# Patient Record
Sex: FEMALE | Race: WHITE | Employment: FULL TIME | URBAN - METROPOLITAN AREA
[De-identification: names, ages, dates, MRNs, and addresses within clinical notes are randomized per-mention and may not be internally consistent; named-entity substitution may affect disease eponyms.]

---

## 2019-10-04 ENCOUNTER — APPOINTMENT (OUTPATIENT)
Dept: NUCLEAR MEDICINE | Age: 54
End: 2019-10-04
Attending: OTOLARYNGOLOGY
Payer: COMMERCIAL

## 2019-10-04 ENCOUNTER — ANESTHESIA EVENT (OUTPATIENT)
Dept: MEDSURG UNIT | Age: 54
End: 2019-10-04
Payer: COMMERCIAL

## 2019-10-04 ENCOUNTER — HOSPITAL ENCOUNTER (OUTPATIENT)
Age: 54
Setting detail: OBSERVATION
Discharge: HOME | End: 2019-10-04
Attending: OTOLARYNGOLOGY | Admitting: OTOLARYNGOLOGY
Payer: COMMERCIAL

## 2019-10-04 ENCOUNTER — ANESTHESIA (OUTPATIENT)
Dept: MEDSURG UNIT | Age: 54
End: 2019-10-04
Payer: COMMERCIAL

## 2019-10-04 VITALS
WEIGHT: 196.4 LBS | HEART RATE: 81 BPM | DIASTOLIC BLOOD PRESSURE: 88 MMHG | OXYGEN SATURATION: 95 % | TEMPERATURE: 97.7 F | BODY MASS INDEX: 29.77 KG/M2 | HEIGHT: 68 IN | RESPIRATION RATE: 16 BRPM | SYSTOLIC BLOOD PRESSURE: 145 MMHG

## 2019-10-04 DIAGNOSIS — D49.7 PARATHYROID NEOPLASM: ICD-10-CM

## 2019-10-04 PROBLEM — E21.3 HYPERPARATHYROIDISM (HCC): Status: ACTIVE | Noted: 2019-10-04

## 2019-10-04 LAB
INTRA-OP PTH, IPTHRT: 136.1 PG/ML (ref 18.4–88)
INTRA-OP PTH, IPTHRT: 17.7 PG/ML (ref 18.4–88)
PTH-INTACT IO % DIF SERPL: ABNORMAL %
SPECIMEN DRAWN SERPL: 1230
SPECIMEN DRAWN SERPL: 1448

## 2019-10-04 PROCEDURE — 77030040361 HC SLV COMPR DVT MDII -B: Performed by: OTOLARYNGOLOGY

## 2019-10-04 PROCEDURE — 85018 HEMOGLOBIN: CPT

## 2019-10-04 PROCEDURE — 76060000062 HC AMB SURG ANES 1 TO 1.5 HR: Performed by: OTOLARYNGOLOGY

## 2019-10-04 PROCEDURE — 77030002996 HC SUT SLK J&J -A: Performed by: OTOLARYNGOLOGY

## 2019-10-04 PROCEDURE — 99218 HC RM OBSERVATION: CPT

## 2019-10-04 PROCEDURE — 77030021052 HC RNG RETRCTR STAY COOP -A: Performed by: OTOLARYNGOLOGY

## 2019-10-04 PROCEDURE — 74011250636 HC RX REV CODE- 250/636: Performed by: NURSE ANESTHETIST, CERTIFIED REGISTERED

## 2019-10-04 PROCEDURE — A9500 TC99M SESTAMIBI: HCPCS

## 2019-10-04 PROCEDURE — 74011000250 HC RX REV CODE- 250: Performed by: NURSE ANESTHETIST, CERTIFIED REGISTERED

## 2019-10-04 PROCEDURE — 77030002933 HC SUT MCRYL J&J -A: Performed by: OTOLARYNGOLOGY

## 2019-10-04 PROCEDURE — 77030026438 HC STYL ET INTUB CARD -A: Performed by: NURSE ANESTHETIST, CERTIFIED REGISTERED

## 2019-10-04 PROCEDURE — 74011250637 HC RX REV CODE- 250/637: Performed by: ANESTHESIOLOGY

## 2019-10-04 PROCEDURE — 77030019655 HC PRB STIM CRAN MEDT -B: Performed by: OTOLARYNGOLOGY

## 2019-10-04 PROCEDURE — 77030008462 HC STPLR SKN PROX J&J -A: Performed by: OTOLARYNGOLOGY

## 2019-10-04 PROCEDURE — 77030036668 HC HEMSTAT APPL W/HEMADERM KT -BARD -F: Performed by: OTOLARYNGOLOGY

## 2019-10-04 PROCEDURE — 77030014008 HC SPNG HEMSTAT J&J -C: Performed by: OTOLARYNGOLOGY

## 2019-10-04 PROCEDURE — 77030011640 HC PAD GRND REM COVD -A: Performed by: OTOLARYNGOLOGY

## 2019-10-04 PROCEDURE — 77030011247 HC DRN WND KT TLS STRY -B: Performed by: OTOLARYNGOLOGY

## 2019-10-04 PROCEDURE — 74011000250 HC RX REV CODE- 250: Performed by: OTOLARYNGOLOGY

## 2019-10-04 PROCEDURE — 76030000019 HC AMB SURG 1 TO 1.5 HR INTENSV-TIER 1: Performed by: OTOLARYNGOLOGY

## 2019-10-04 PROCEDURE — 74011250636 HC RX REV CODE- 250/636: Performed by: OTOLARYNGOLOGY

## 2019-10-04 PROCEDURE — 77030018836 HC SOL IRR NACL ICUM -A: Performed by: OTOLARYNGOLOGY

## 2019-10-04 PROCEDURE — 83970 ASSAY OF PARATHORMONE: CPT

## 2019-10-04 PROCEDURE — 77030040356 HC CORD BPLR FRCP COVD -A: Performed by: OTOLARYNGOLOGY

## 2019-10-04 PROCEDURE — 88331 PATH CONSLTJ SURG 1 BLK 1SPC: CPT

## 2019-10-04 PROCEDURE — 74011250636 HC RX REV CODE- 250/636: Performed by: ANESTHESIOLOGY

## 2019-10-04 PROCEDURE — 77030032988 HC TU ET NIM TRIVNTG EMG MEDT -D: Performed by: OTOLARYNGOLOGY

## 2019-10-04 PROCEDURE — 77030031139 HC SUT VCRL2 J&J -A: Performed by: OTOLARYNGOLOGY

## 2019-10-04 PROCEDURE — 77030040922 HC BLNKT HYPOTHRM STRY -A

## 2019-10-04 PROCEDURE — 77030031753 HC SHR ENDO COAG HARM J&J -E: Performed by: OTOLARYNGOLOGY

## 2019-10-04 PROCEDURE — 88305 TISSUE EXAM BY PATHOLOGIST: CPT

## 2019-10-04 PROCEDURE — 77030002916 HC SUT ETHLN J&J -A: Performed by: OTOLARYNGOLOGY

## 2019-10-04 PROCEDURE — 76210000035 HC AMBSU PH I REC 1 TO 1.5 HR: Performed by: OTOLARYNGOLOGY

## 2019-10-04 PROCEDURE — 36415 COLL VENOUS BLD VENIPUNCTURE: CPT

## 2019-10-04 PROCEDURE — 77030011267 HC ELECTRD BLD COVD -A: Performed by: OTOLARYNGOLOGY

## 2019-10-04 RX ORDER — MIDAZOLAM HYDROCHLORIDE 1 MG/ML
0.5 INJECTION, SOLUTION INTRAMUSCULAR; INTRAVENOUS
Status: DISCONTINUED | OUTPATIENT
Start: 2019-10-04 | End: 2019-10-04 | Stop reason: HOSPADM

## 2019-10-04 RX ORDER — HYDROMORPHONE HYDROCHLORIDE 1 MG/ML
0.2 INJECTION, SOLUTION INTRAMUSCULAR; INTRAVENOUS; SUBCUTANEOUS
Status: DISCONTINUED | OUTPATIENT
Start: 2019-10-04 | End: 2019-10-04 | Stop reason: HOSPADM

## 2019-10-04 RX ORDER — ONDANSETRON 2 MG/ML
4 INJECTION INTRAMUSCULAR; INTRAVENOUS
Status: DISCONTINUED | OUTPATIENT
Start: 2019-10-04 | End: 2019-10-04 | Stop reason: HOSPADM

## 2019-10-04 RX ORDER — OXYCODONE AND ACETAMINOPHEN 5; 325 MG/1; MG/1
1 TABLET ORAL
Status: DISCONTINUED | OUTPATIENT
Start: 2019-10-04 | End: 2019-10-04 | Stop reason: HOSPADM

## 2019-10-04 RX ORDER — CEFAZOLIN SODIUM/WATER 2 G/20 ML
2 SYRINGE (ML) INTRAVENOUS ONCE
Status: COMPLETED | OUTPATIENT
Start: 2019-10-04 | End: 2019-10-04

## 2019-10-04 RX ORDER — DIPHENHYDRAMINE HYDROCHLORIDE 50 MG/ML
12.5 INJECTION, SOLUTION INTRAMUSCULAR; INTRAVENOUS AS NEEDED
Status: DISCONTINUED | OUTPATIENT
Start: 2019-10-04 | End: 2019-10-04 | Stop reason: HOSPADM

## 2019-10-04 RX ORDER — MIDAZOLAM HYDROCHLORIDE 1 MG/ML
1 INJECTION, SOLUTION INTRAMUSCULAR; INTRAVENOUS AS NEEDED
Status: DISCONTINUED | OUTPATIENT
Start: 2019-10-04 | End: 2019-10-04 | Stop reason: HOSPADM

## 2019-10-04 RX ORDER — DEXAMETHASONE SODIUM PHOSPHATE 4 MG/ML
INJECTION, SOLUTION INTRA-ARTICULAR; INTRALESIONAL; INTRAMUSCULAR; INTRAVENOUS; SOFT TISSUE AS NEEDED
Status: DISCONTINUED | OUTPATIENT
Start: 2019-10-04 | End: 2019-10-04 | Stop reason: HOSPADM

## 2019-10-04 RX ORDER — LIDOCAINE HYDROCHLORIDE 10 MG/ML
0.1 INJECTION, SOLUTION EPIDURAL; INFILTRATION; INTRACAUDAL; PERINEURAL AS NEEDED
Status: DISCONTINUED | OUTPATIENT
Start: 2019-10-04 | End: 2019-10-04 | Stop reason: HOSPADM

## 2019-10-04 RX ORDER — ONDANSETRON 8 MG/1
4-8 TABLET, ORALLY DISINTEGRATING ORAL
Qty: 15 TAB | Refills: 1 | Status: SHIPPED | OUTPATIENT
Start: 2019-10-04

## 2019-10-04 RX ORDER — MIDAZOLAM HYDROCHLORIDE 1 MG/ML
INJECTION, SOLUTION INTRAMUSCULAR; INTRAVENOUS AS NEEDED
Status: DISCONTINUED | OUTPATIENT
Start: 2019-10-04 | End: 2019-10-04 | Stop reason: HOSPADM

## 2019-10-04 RX ORDER — SODIUM CHLORIDE, SODIUM LACTATE, POTASSIUM CHLORIDE, CALCIUM CHLORIDE 600; 310; 30; 20 MG/100ML; MG/100ML; MG/100ML; MG/100ML
100 INJECTION, SOLUTION INTRAVENOUS CONTINUOUS
Status: DISCONTINUED | OUTPATIENT
Start: 2019-10-04 | End: 2019-10-04 | Stop reason: HOSPADM

## 2019-10-04 RX ORDER — ONDANSETRON 2 MG/ML
4 INJECTION INTRAMUSCULAR; INTRAVENOUS AS NEEDED
Status: DISCONTINUED | OUTPATIENT
Start: 2019-10-04 | End: 2019-10-04 | Stop reason: HOSPADM

## 2019-10-04 RX ORDER — FENTANYL CITRATE 50 UG/ML
25 INJECTION, SOLUTION INTRAMUSCULAR; INTRAVENOUS
Status: DISCONTINUED | OUTPATIENT
Start: 2019-10-04 | End: 2019-10-04 | Stop reason: HOSPADM

## 2019-10-04 RX ORDER — LIDOCAINE HYDROCHLORIDE AND EPINEPHRINE 10; 10 MG/ML; UG/ML
INJECTION, SOLUTION INFILTRATION; PERINEURAL AS NEEDED
Status: DISCONTINUED | OUTPATIENT
Start: 2019-10-04 | End: 2019-10-04 | Stop reason: HOSPADM

## 2019-10-04 RX ORDER — OXYCODONE AND ACETAMINOPHEN 10; 325 MG/1; MG/1
1 TABLET ORAL
Status: DISCONTINUED | OUTPATIENT
Start: 2019-10-04 | End: 2019-10-04 | Stop reason: HOSPADM

## 2019-10-04 RX ORDER — ONDANSETRON 2 MG/ML
INJECTION INTRAMUSCULAR; INTRAVENOUS AS NEEDED
Status: DISCONTINUED | OUTPATIENT
Start: 2019-10-04 | End: 2019-10-04 | Stop reason: HOSPADM

## 2019-10-04 RX ORDER — EPHEDRINE SULFATE/0.9% NACL/PF 50 MG/5 ML
SYRINGE (ML) INTRAVENOUS AS NEEDED
Status: DISCONTINUED | OUTPATIENT
Start: 2019-10-04 | End: 2019-10-04 | Stop reason: HOSPADM

## 2019-10-04 RX ORDER — SODIUM CHLORIDE 0.9 % (FLUSH) 0.9 %
5-40 SYRINGE (ML) INJECTION AS NEEDED
Status: DISCONTINUED | OUTPATIENT
Start: 2019-10-04 | End: 2019-10-04 | Stop reason: HOSPADM

## 2019-10-04 RX ORDER — ACETAMINOPHEN 325 MG/1
650 TABLET ORAL ONCE
Status: COMPLETED | OUTPATIENT
Start: 2019-10-04 | End: 2019-10-04

## 2019-10-04 RX ORDER — EPHEDRINE SULFATE/0.9% NACL/PF 50 MG/5 ML
5 SYRINGE (ML) INTRAVENOUS AS NEEDED
Status: DISCONTINUED | OUTPATIENT
Start: 2019-10-04 | End: 2019-10-04 | Stop reason: HOSPADM

## 2019-10-04 RX ORDER — CHOLECALCIFEROL (VITAMIN D3) 125 MCG
2000 CAPSULE ORAL
COMMUNITY
End: 2019-10-04

## 2019-10-04 RX ORDER — FENTANYL CITRATE 50 UG/ML
INJECTION, SOLUTION INTRAMUSCULAR; INTRAVENOUS AS NEEDED
Status: DISCONTINUED | OUTPATIENT
Start: 2019-10-04 | End: 2019-10-04 | Stop reason: HOSPADM

## 2019-10-04 RX ORDER — LIDOCAINE HYDROCHLORIDE 20 MG/ML
INJECTION, SOLUTION EPIDURAL; INFILTRATION; INTRACAUDAL; PERINEURAL AS NEEDED
Status: DISCONTINUED | OUTPATIENT
Start: 2019-10-04 | End: 2019-10-04 | Stop reason: HOSPADM

## 2019-10-04 RX ORDER — ROPIVACAINE HYDROCHLORIDE 5 MG/ML
150 INJECTION, SOLUTION EPIDURAL; INFILTRATION; PERINEURAL AS NEEDED
Status: DISCONTINUED | OUTPATIENT
Start: 2019-10-04 | End: 2019-10-04 | Stop reason: HOSPADM

## 2019-10-04 RX ORDER — PHENYLEPHRINE HCL IN 0.9% NACL 0.4MG/10ML
SYRINGE (ML) INTRAVENOUS AS NEEDED
Status: DISCONTINUED | OUTPATIENT
Start: 2019-10-04 | End: 2019-10-04 | Stop reason: HOSPADM

## 2019-10-04 RX ORDER — PROPOFOL 10 MG/ML
INJECTION, EMULSION INTRAVENOUS AS NEEDED
Status: DISCONTINUED | OUTPATIENT
Start: 2019-10-04 | End: 2019-10-04 | Stop reason: HOSPADM

## 2019-10-04 RX ORDER — SODIUM CHLORIDE 9 MG/ML
25 INJECTION, SOLUTION INTRAVENOUS CONTINUOUS
Status: DISCONTINUED | OUTPATIENT
Start: 2019-10-04 | End: 2019-10-04 | Stop reason: HOSPADM

## 2019-10-04 RX ORDER — ROCURONIUM BROMIDE 10 MG/ML
INJECTION, SOLUTION INTRAVENOUS AS NEEDED
Status: DISCONTINUED | OUTPATIENT
Start: 2019-10-04 | End: 2019-10-04 | Stop reason: HOSPADM

## 2019-10-04 RX ORDER — SODIUM CHLORIDE 0.9 % (FLUSH) 0.9 %
5-40 SYRINGE (ML) INJECTION EVERY 8 HOURS
Status: DISCONTINUED | OUTPATIENT
Start: 2019-10-04 | End: 2019-10-04 | Stop reason: HOSPADM

## 2019-10-04 RX ORDER — FERROUS SULFATE, DRIED 160(50) MG
2 TABLET, EXTENDED RELEASE ORAL EVERY 6 HOURS
Status: DISCONTINUED | OUTPATIENT
Start: 2019-10-04 | End: 2019-10-04 | Stop reason: HOSPADM

## 2019-10-04 RX ORDER — FERROUS SULFATE, DRIED 160(50) MG
2 TABLET, EXTENDED RELEASE ORAL EVERY 6 HOURS
Qty: 136 TAB | Refills: 1 | Status: SHIPPED | OUTPATIENT
Start: 2019-10-04 | End: 2019-10-18

## 2019-10-04 RX ORDER — ACETAMINOPHEN 325 MG/1
650 TABLET ORAL
Status: DISCONTINUED | OUTPATIENT
Start: 2019-10-04 | End: 2019-10-04 | Stop reason: HOSPADM

## 2019-10-04 RX ORDER — MORPHINE SULFATE 10 MG/ML
2 INJECTION, SOLUTION INTRAMUSCULAR; INTRAVENOUS
Status: DISCONTINUED | OUTPATIENT
Start: 2019-10-04 | End: 2019-10-04 | Stop reason: HOSPADM

## 2019-10-04 RX ORDER — FENTANYL CITRATE 50 UG/ML
50 INJECTION, SOLUTION INTRAMUSCULAR; INTRAVENOUS AS NEEDED
Status: DISCONTINUED | OUTPATIENT
Start: 2019-10-04 | End: 2019-10-04 | Stop reason: HOSPADM

## 2019-10-04 RX ORDER — OXYCODONE HYDROCHLORIDE AND ACETAMINOPHEN 7.5; 325 MG/1; MG/1
1 TABLET ORAL
Qty: 42 TAB | Refills: 0 | Status: SHIPPED | OUTPATIENT
Start: 2019-10-04 | End: 2019-10-11

## 2019-10-04 RX ORDER — DEXTROSE, SODIUM CHLORIDE, AND POTASSIUM CHLORIDE 5; .45; .15 G/100ML; G/100ML; G/100ML
25 INJECTION INTRAVENOUS CONTINUOUS
Status: DISCONTINUED | OUTPATIENT
Start: 2019-10-04 | End: 2019-10-04 | Stop reason: HOSPADM

## 2019-10-04 RX ORDER — SCOLOPAMINE TRANSDERMAL SYSTEM 1 MG/1
1 PATCH, EXTENDED RELEASE TRANSDERMAL
Status: DISCONTINUED | OUTPATIENT
Start: 2019-10-04 | End: 2019-10-04 | Stop reason: HOSPADM

## 2019-10-04 RX ORDER — DEXMEDETOMIDINE HYDROCHLORIDE 100 UG/ML
INJECTION, SOLUTION INTRAVENOUS AS NEEDED
Status: DISCONTINUED | OUTPATIENT
Start: 2019-10-04 | End: 2019-10-04 | Stop reason: HOSPADM

## 2019-10-04 RX ORDER — SUCCINYLCHOLINE CHLORIDE 20 MG/ML
INJECTION INTRAMUSCULAR; INTRAVENOUS AS NEEDED
Status: DISCONTINUED | OUTPATIENT
Start: 2019-10-04 | End: 2019-10-04 | Stop reason: HOSPADM

## 2019-10-04 RX ORDER — SODIUM CHLORIDE, SODIUM LACTATE, POTASSIUM CHLORIDE, CALCIUM CHLORIDE 600; 310; 30; 20 MG/100ML; MG/100ML; MG/100ML; MG/100ML
1000 INJECTION, SOLUTION INTRAVENOUS CONTINUOUS
Status: DISCONTINUED | OUTPATIENT
Start: 2019-10-04 | End: 2019-10-04 | Stop reason: HOSPADM

## 2019-10-04 RX ORDER — SODIUM CHLORIDE, SODIUM LACTATE, POTASSIUM CHLORIDE, CALCIUM CHLORIDE 600; 310; 30; 20 MG/100ML; MG/100ML; MG/100ML; MG/100ML
INJECTION, SOLUTION INTRAVENOUS
Status: DISCONTINUED | OUTPATIENT
Start: 2019-10-04 | End: 2019-10-04 | Stop reason: HOSPADM

## 2019-10-04 RX ORDER — OXYCODONE HYDROCHLORIDE 5 MG/1
5 TABLET ORAL AS NEEDED
Status: DISCONTINUED | OUTPATIENT
Start: 2019-10-04 | End: 2019-10-04 | Stop reason: HOSPADM

## 2019-10-04 RX ADMIN — PROPOFOL 50 MG: 10 INJECTION, EMULSION INTRAVENOUS at 14:24

## 2019-10-04 RX ADMIN — Medication 2 G: at 14:14

## 2019-10-04 RX ADMIN — FENTANYL CITRATE 50 MCG: 50 INJECTION, SOLUTION INTRAMUSCULAR; INTRAVENOUS at 14:06

## 2019-10-04 RX ADMIN — Medication 80 MCG: at 14:44

## 2019-10-04 RX ADMIN — SUCCINYLCHOLINE CHLORIDE 140 MG: 20 INJECTION, SOLUTION INTRAMUSCULAR; INTRAVENOUS at 14:01

## 2019-10-04 RX ADMIN — PROPOFOL 200 MG: 10 INJECTION, EMULSION INTRAVENOUS at 14:01

## 2019-10-04 RX ADMIN — Medication 10 MG: at 14:18

## 2019-10-04 RX ADMIN — SODIUM CHLORIDE, POTASSIUM CHLORIDE, SODIUM LACTATE AND CALCIUM CHLORIDE: 600; 310; 30; 20 INJECTION, SOLUTION INTRAVENOUS at 13:46

## 2019-10-04 RX ADMIN — SODIUM CHLORIDE, SODIUM LACTATE, POTASSIUM CHLORIDE, AND CALCIUM CHLORIDE 1000 ML: 600; 310; 30; 20 INJECTION, SOLUTION INTRAVENOUS at 12:37

## 2019-10-04 RX ADMIN — DEXMEDETOMIDINE HYDROCHLORIDE 4 MCG: 100 INJECTION, SOLUTION, CONCENTRATE INTRAVENOUS at 15:00

## 2019-10-04 RX ADMIN — LIDOCAINE HYDROCHLORIDE 100 MG: 20 INJECTION, SOLUTION EPIDURAL; INFILTRATION; INTRACAUDAL; PERINEURAL at 14:01

## 2019-10-04 RX ADMIN — DEXMEDETOMIDINE HYDROCHLORIDE 4 MCG: 100 INJECTION, SOLUTION, CONCENTRATE INTRAVENOUS at 14:18

## 2019-10-04 RX ADMIN — MIDAZOLAM 2 MG: 1 INJECTION INTRAMUSCULAR; INTRAVENOUS at 13:56

## 2019-10-04 RX ADMIN — ACETAMINOPHEN 650 MG: 325 TABLET, FILM COATED ORAL at 12:37

## 2019-10-04 RX ADMIN — DEXAMETHASONE SODIUM PHOSPHATE 8 MG: 4 INJECTION, SOLUTION INTRAMUSCULAR; INTRAVENOUS at 14:11

## 2019-10-04 RX ADMIN — FENTANYL CITRATE 25 MCG: 50 INJECTION INTRAMUSCULAR; INTRAVENOUS at 15:26

## 2019-10-04 RX ADMIN — PROPOFOL 50 MG: 10 INJECTION, EMULSION INTRAVENOUS at 14:41

## 2019-10-04 RX ADMIN — PROPOFOL 50 MG: 10 INJECTION, EMULSION INTRAVENOUS at 14:28

## 2019-10-04 RX ADMIN — SODIUM CHLORIDE, SODIUM LACTATE, POTASSIUM CHLORIDE, AND CALCIUM CHLORIDE 1000 ML: 600; 310; 30; 20 INJECTION, SOLUTION INTRAVENOUS at 12:43

## 2019-10-04 RX ADMIN — Medication 10 MG: at 14:14

## 2019-10-04 RX ADMIN — DEXMEDETOMIDINE HYDROCHLORIDE 6 MCG: 100 INJECTION, SOLUTION, CONCENTRATE INTRAVENOUS at 14:30

## 2019-10-04 RX ADMIN — ONDANSETRON HYDROCHLORIDE 4 MG: 2 INJECTION, SOLUTION INTRAVENOUS at 14:11

## 2019-10-04 RX ADMIN — FENTANYL CITRATE 50 MCG: 50 INJECTION, SOLUTION INTRAMUSCULAR; INTRAVENOUS at 14:01

## 2019-10-04 RX ADMIN — ROCURONIUM BROMIDE 5 MG: 10 SOLUTION INTRAVENOUS at 14:01

## 2019-10-04 RX ADMIN — DEXMEDETOMIDINE HYDROCHLORIDE 6 MCG: 100 INJECTION, SOLUTION, CONCENTRATE INTRAVENOUS at 14:11

## 2019-10-04 NOTE — BRIEF OP NOTE
BRIEF OPERATIVE NOTE    DDate of Procedure: 04 October June 2019  Pre-operative Diagnosis: Primary Hyperparathyroidism  Post-operative Diagnosis: Primary Hyperparathyroidism  Procedure(s):   Neck exploration   Radioguided parathyroid surgery  Parathyroidectomy  resection of LEFT SUPERIOR parathyroid adenoma  Laryngeal nerve monitoring  Surgeon(s) and Role:   Panel 1:   * Marla Retana MD  Co-surgeon   * Vannesa Berry MD - Co-surgeon   Anesthesia: General +4 ml of Local (50:50 mix of 1% LIDO + EPI)   Urine output: Not documented   Estimated Blood Loss: 1 ml    IVF: 700 ml   Drains: None   Pre-operative iPTH = 136.1 pg/ml   Patient in room at 1357 hours   Antibiotic prophylaxis ANCEF 2.0 gm given at 1414 hours   Beta blocker not indicated   Pre-prep time out: 1411 hours   Prayer at 1423 hours   Time out for Surgery at 1423 hours   (Correct patient, operative site and procedure confirmed, along with having the necessary equipment on hand to perform the operation safely)   Start of surgery at 1423 hours   End of surgery at 1452 hours   VTE prophylaxis with bilateral thigh high TAMIA hose and bilateral lower extremity compression devices   Pressure points padded   Sponge, sharp and instrument count: Correct   History:  58-year-old female with symptomatic primary hyperparathyroidism. History of multiple kidney stones. She has biochemically confirmed primary hyperparathyroidism:   Serum calcium 11.1 mg/dl  iPTH 138.1 pg/ml    Pre-operative sestamibi scan shows left-sided neck uptake consistent with left sided parathyroid adenoma    Pre-op holding area: The patient was seen in the pre-operative holding area.    Patient was informed of the indications, nature, risks, benefits, alternatives and expected outcomes of the proposed operation:   Neck exploration   Radioguided parathyroid surgery / parathyroidectomy   All other indicated procedures   The patient previously voiced understanding of the aforesaid and provided informed consent for the planned operation   The patient asked pertinent questions, all of which were answered to her apparent satisfaction   The informed consent was reviewed in the holding area and the patient voiced understanding and agreement. The patient agreed to proceed with the recommended operative procedure (Neck exploration; radioguided parathyroid surgery / parathyroidectomy; all other indicated procedures). The patient was examined, and the operative site was marked. Pre-operative Sestamibi Scan done prior to operation is localizing to the left side of the neck in the superior parathyroid gland location  Procedure (s) performed:   Neck exploration   Radioguided parathyroid surgery  Parathyroidectomy  resection of LEFT SUPERIOR parathyroid adenoma  Laryngeal nerve monitoring   Findings:   One abnormal parathyroid gland identified  grossly consistent with adenoma in the LEFT SUPERIOR anatomical location   Clinically normal right superior parathyroid gland  Clinically normal right inferior parathyroid gland  Clinically normal left inferior parathyroid gland  Left superior parathyroid adenoma (44 x 18 mm; 2.60 grams); completely excised  intact and submitted to pathology. Frozen section: confirmatory  hypercellular parathyroid tissue   Left inferior parathyroid gland, clinically normal (6 x 4 mm); No biopsy obtained. Right superior parathyroid gland, clinically normal (5 x 4 mm); No biopsy obtained. Right inferior parathyroid gland, clinically normal (6 x 4 mm); No biopsy obtained. No palpable thyroid nodule  No findings suspicious for malignancy. No palpable central or lateral neck adenopathy.    A diligent search of the central and both lateral areas of the neck was unrevealing, specifically there was no palpable adenopathy in either the right or left lateral (Level II, III, IV) neck, or the central compartment (Level VI and VII)     Both right and left recurrent laryngeal nerves were identified and carefully preserved throughout the entire course of the operation, and both recurrent laryngeal nerves were confirmed to be structurally and functionally intact. The structural and functional integrity of the left and right recurrent laryngeal nerves was confirmed with the nerve stimulator (NIM System), as prominent audible signal was attained when both branches of the left and right recurrent laryngeal nerves were stimulated. Excellent hemostasis confirmed at end of operation       Specimens:  Para-   thyroid  Right    Left      Type  Score   Type  Score    Upper     Upper      Parathyroid Clinically normal   (5 x 4 mm in size)  Biopsy not obtained  Frozen section: None    Parathyroid Adenoma   (44 x 18 mm in size;   weight 2.60 grams)   Completely excised   Frozen: Hypercellular parathyroid   Counts = 394  Background: 34     Lower          Parathyroid Clinically normal   (6 x 4 mm in size)  Biopsy not obtained  Frozen section: None    Parathyroid Clinically normal   (6 x 4 mm in size)  Biopsy not obtained  Frozen section: None          Surgical Staff:  Circ-1: Lyndsay Shelton RN  Circ-2: Christina Bingham RN  Scrub Tech-1: Minerva Paulino  Surg Asst-1: Vee GAUTAM  Event Time In Time Out   Incision Start 1423    Incision Close 1500        Specimens:   ID Type Source Tests Collected by Time Destination   1 : left superior parathyroid adenoma  Frozen Section Parathyroid  Bharati Merida MD 10/4/2019 1432 Pathology      Operation:   The patient was escorted to the operating room   Upon arrival to the operative room, the patient, procedure, and operative site were confirmed via a pre-operative time-out; all staff in attendance were in agreement. The patient was placed in the supine position and general anesthesia induced without incident, using a NIM endotracheal tube for laryngeal nerve monitoring.    Prophylactic antibiotic (ANCEF 2 g IVPB) was administered prior to the procedure. Beta blocker not indicated  With the patient in the supine position the arms were tucked, a pillow was placed behind the knees and the heels padded; the neck was slightly extended, and head of the operating table elevated to 30 degrees. Roll was placed behind the scapulae to create mild degree of neck flexion   Pressure ulcer prophylaxis was in effect with pressure point padding   VTE prophylaxis was also in effect with TAMIA hose and lower extremity mechanical compression devices   Sterile anterior neck prep (Chlorhexidine - alcohol) and drape   We prayed for our patient and we repeated the TIME OUT prior to commencing the operation to confirm the    correct patient,    correct operative site,    correct operative procedure, and    necessary equipment on hand to conduct the operation safely. Local anesthesia (4 ml of 50:50 mix of 1% LIDO + EPI) infiltrated subcutaneously at site of planned anterior cervical skin incision   Pre-operative serum iPTH level was 136.1 pg/ml. A 5 cm, anterior cervical incision made. Limited sub-platysmal flaps were created. [email or web address] Navigator GPS radio-immuno-guided surgery device was set on Tc99m and 100X. Strap musculature was  in the midline. A dissection plane was developed posterior to the right strap musculature using cautery and facilitated by atraumatic [facility] retractors. Jackolyn Baptise was used to displace the right thyroid lobe anteriorly and medially as the right lateral thyroid recess was developed using electrocautery, and then gentle blunt dissection using the Kitner dissector. The fibroareolar floor in the right posterior neck was opened with electrocautery that provided access to the right retroesophageal space.    We sought to identify the key structures for exposure of the right superior parathyroid gland the arteriovenous band situated immediately cephalad to where the right middle thyroid vein is situated, and posterior and lateral to the recurrent laryngeal nerve. The superior parathyroid gland is usually encountered at the Ligament of Perez dorsolateral to the recurrent laryngeal nerve. The superior parathyroid gland originates from the 4th pharyngeal pouch. The superior parathyroid gland is more posterior in position relative to the more anteriorly situated inferior parathyroid gland. The inferior parathyroid gland is usually encountered anterior and medial to the recurrent laryngeal nerve, inferior to where the recurrent laryngeal nerve crosses the inferior thyroid artery. The inferior parathyroid gland originates from the 3rd pharyngeal pouch. Typically (~75% of cases), the superior parathyroid gland is situated near the posterior aspect of the thyroid gland, at the level of the cricothyroid junction, in proximity to where the recurrent laryngeal nerve enters the larynx. The superior parathyroid gland is consistently situated dorsal/posterior and lateral to the recurrent laryngeal nerve. In a smaller percentage of patients (~24% of cases) the superior parathyroid gland is adjacent to the superior pole of the thyroid gland. In a very small percentage of patients (~1%), the superior parathyroid gland is in a retro-pharyngeal, para-esophageal, retro-esophageal, or posterior-superior mediastinal location. Rarely is the superior parathyroid gland situated within the substance of the superior or lateral pole of the thyroid gland. The right superior parathyroid gland was identified and found to be clinically normal in size and appearance (5 x 4 mm in size)  soft, tan, flattened, ovoid and smoothly encapsulated. We took care not to disrupt the right superior parathyroid gland during dissection, mobilization and complete evaluation of this parathyroid gland.    The normal appearing right superior parathyroid gland was not biopsied  ---   We began the search for the right inferior parathyroid gland by dissecting gently low into the thyro-thymic ligament and apical thymus, and then proceeding cephalad with the dissection. The anatomic location of the inferior parathyroid gland is much more variable than that of the superior parathyroid gland, given the longer and more variable embryological migratory path of the 3rd pharyngeal pouch with the thymus. The inferior parathyroid gland may be embedded within the thymus, within the thyroid gland itself, or found within the anterior or posterior mediastinum. The most common location we find the inferior parathyroid gland (~40% of the time) is in proximity to the posterior thyroid capsule at the level of the 1st tracheal ring, in proximity to the inferior thyroid artery - anterior and medial to the recurrent laryngeal nerve, inferior to where the nerve crosses the inferior thyroid artery. When we dont find the inferior parathyroid gland in this location we search for other possible anatomic locations  thyro-thymic ligament, within the substance of the thymus near the level of the thoracic inlet (in ~40% of cases the inferior parathyroid gland is found in this location). Other potential anatomic locations for the inferior parathyroid gland include lateral to the thyroid gland (15%), or in atypical or ectopic locations (carotid sheath or mid-thyroid level lateral to the carotid sheath, or deep within the thyroid gland itself 2% of cases). When not identified during initial operation, post-operative imaging is performed in cases of persistent or recurrent hyperparathyroidism to search for the abnormal inferior parathyroid gland, or in rare cases, the supernumerary or 5th parathyroid gland.    Anatomic areas assessed during post-operative imaging in patients with persistent or recurrent hyperparathyroidism include the anterior or posterior mediastinum, posterior to the trachea and anterior to the esophagus, or posterior to the esophagus, and at or above the angle of the mandible (to assess for an undescended parathyroid gland). The right inferior parathyroid gland was identified and found to be clinically normal in size and appearance (6 x 4 mm in size)  soft, tan, flattened, ovoid and smoothly encapsulated. We took care not to disrupt the right superior parathyroid gland during dissection, mobilization and complete evaluation of this parathyroid gland. The normal appearing right inferior parathyroid gland was not biopsied  ---   Attention was then directed to the left neck. A dissection plane was developed posterior to the left neck strap musculature using cautery and facilitated by atraumatic popexpert-Lecompte retractors   A Milon Hirschfeld was used to displace the left thyroid lobe anteromedially as the left lateral thyroid recess was developed using electrocautery, and then gentle blunt dissection using a Kitner dissector. The fibroareolar floor was opened with electrocautery that provided access to the left posterior neck  the retroesophageal space. We sought to identify the key structures for exposure of the left superior parathyroid gland the arteriovenous band situated immediately cephalad to where the left middle thyroid vein is situated. The left superior parathyroid gland was situated just posterior and lateral to the encountered left recurrent laryngeal nerve   Dissection was performed using a combination of gentle blunt Kitner dissection as well as the tip of a right-angle clamp to identify and expose an abnormal appearing left superior parathyroid gland (44 x 18 mm in size)  grossly consistent with left superior parathyroid gland adenoma. We took care not to disrupt the parathyroid gland during dissection, mobilization and complete evaluation of the left superior parathyroid gland. ---   We began the search for the left inferior parathyroid gland by dissecting gently low into the thyro-thymic ligament and thymus and then proceeding cephalad with the dissection.    Dissection was performed using a combination of gentle blunt Kitner dissection as well as the tip of a right-angle clamp to identify and expose a normal appearing left inferior parathyroid gland. The left inferior parathyroid gland was situated just anterior and medial to the encountered left recurrent laryngeal nerve   The left inferior parathyroid gland was examined in its entirety and found to be clinically normal in size and appearance (6 x 4 mm in size)  soft, tan, flattened, ovoid and smoothly encapsulated. We took care not to disrupt the parathyroid gland capsule during dissection, mobilization and complete evaluation of the left inferior parathyroid gland. The normal appearing left inferior parathyroid gland was not biopsied   ---   Having identified four parathyroid glands, one of which was abnormal in size and appearance (the left superior parathyroid gland), we re-directed our attention to the abnormal parathyroid gland  We took care not to disrupt the parathyroid gland during dissection, mobilization and complete evaluation of the left superior parathyroid gland. The left superior parathyroid gland adenoma was excised (44 x 18 mm in size and 2.60 grams in weight with ex vivo counts of 394 pg/mg/min and background count of 34 pg/mg/min; i.e. hyperactive parathyroid), and submitted to frozen section, which was confirmatory  hypercellular parathyroid tissue   Blood was drawn after the excision of the left superior parathyroid adenoma - serum iPTH 10 minutes following left superior parathyroid adenoma resection was 17.7 pg/ml. ---  Throughout our dissection on both sides of the neck we oriented on the recurrent laryngeal nerves and kept the dissection on the capsule of all four parathyroid glands, taking great care not to rupture the parathyroid gland capsule during complete assessment of each parathyroid gland.    We made certain to expose the entire parathyroid gland for each one identified to assess each gland fully. As we did on the right, during dissection on the left we took care to maintain the encountered recurrent laryngeal nerve out of harms way. With three normal appearing parathyroid glands identified (right and left inferior and right superior parathyroid glands), and one abnormal parathyroid gland excised (left superior parathyroid adenoma  hypercellular parathyroid tissue on frozen section), and with normalization of serum iPTH after removal of the left superior parathyroid adenoma, we elected to conclude the operation. To summarize: 10 minutes post-resection of the left superior parathyroid adenoma, serum iPTH level dropped from 136.1 pg/ml to 17.7 pg/ml, biochemically consistent with curative resection according to our pre-specified criteria:   1. > 50% drop in serum iPTH from pre-operative baseline, and   2. drop of serum iPTH into the normal range. The structural and functional integrity of the left and right recurrent laryngeal nerves was confirmed with the nerve stimulator (Simple.TV System), as a loud audible signal was attained when both branches of the left and right recurrent laryngeal nerves were stimulated. A diligent search of the central and both lateral areas of the neck was unrevealing, specifically there was no palpable adenopathy in either the right or left lateral (Level II, III, IV) neck, or the central compartment (Level VI and VII). Excellent hemostasis was confirmed. The operative site was irrigated with normal saline and excellent hemostasis confirmed. Excellent hemostasis in the operative field was re-confirmed under repeated Valsalva maneuver. The operative field was irrigated with saline and hemostasis once again confirmed  Yrn was placed in the operative field as a hemostatic adjunct   The strap musculature (sternothyroid and sternohyoid muscles) was re-approximated in the midline with running 3-0 Vicryl suture.    The platysma muscle was reconstituted with running absorbable (3-0 Vicryl) suture. The wound was irrigated with saline. The skin closure was completed with running subcuticular 5-0 monocryl suture and transversely oriented Steri-strips applied to the closed surgical incision   This was an uncomplicated operation with minimal blood loss. The patient was extubated without incident and escorted to the PACU in stable condition with aspiration precautions in effect   Complications: None   Implants: None   Disposition:    To PACU extubated and in stable condition with aspiration precautions in effect   Roman Blanc MD

## 2019-10-04 NOTE — ANESTHESIA PREPROCEDURE EVALUATION
Relevant Problems   No relevant active problems       Anesthetic History     PONV          Review of Systems / Medical History  Patient summary reviewed, nursing notes reviewed and pertinent labs reviewed    Pulmonary          Smoker         Neuro/Psych   Within defined limits           Cardiovascular    Hypertension                   GI/Hepatic/Renal  Within defined limits              Endo/Other        Obesity     Other Findings            Physical Exam    Airway  Mallampati: II  TM Distance: > 6 cm  Neck ROM: normal range of motion   Mouth opening: Normal     Cardiovascular  Regular rate and rhythm,  S1 and S2 normal,  no murmur, click, rub, or gallop             Dental  No notable dental hx       Pulmonary  Breath sounds clear to auscultation               Abdominal  GI exam deferred       Other Findings            Anesthetic Plan    ASA: 2  Anesthesia type: general          Induction: Intravenous  Anesthetic plan and risks discussed with: Patient

## 2019-10-04 NOTE — ANESTHESIA POSTPROCEDURE EVALUATION
Post-Anesthesia Evaluation and Assessment    Patient: George Zhang MRN: [id number]  SSN: [id number]    YOB: 1965  Age: 53 y.o. Sex: female      I have evaluated the patient and they are stable and ready for discharge from the PACU. Cardiovascular Function/Vital Signs  Visit Vitals  /71   Pulse 72   Temp 36.4 °C (97.6 °F)   Resp 17   Ht 5' 7.5\" (1.715 m)   Wt 89.1 kg (196 lb 6.4 oz)   SpO2 96%   BMI 30.31 kg/m²       Patient is status post General anesthesia for Procedure(s): MININMALLY INVASIVE RADIOGUIDED PARATHRYROID EXPLORATION WITH IOPTH. Nausea/Vomiting: None    Postoperative hydration reviewed and adequate. Pain:  Pain Scale 1: Numeric (0 - 10) (10/04/19 1221)  Pain Intensity 1: 0 (10/04/19 1221)   Managed    Neurological Status:   Neuro (WDL): Within Defined Limits (10/04/19 1252)   At baseline    Mental Status, Level of Consciousness: Alert and  oriented to person, place, and time    Pulmonary Status:   O2 Device: Room air (10/04/19 1508)   Adequate oxygenation and airway patent    Complications related to anesthesia: None    Post-anesthesia assessment completed. No concerns    Signed By: Quiana Dillon MD     October 4, 2019              Procedure(s): MININMALLY INVASIVE RADIOGUIDED PARATHRYROID EXPLORATION WITH IOPTH. general    <BSHSIANPOST>    Vitals Value Taken Time   /71 10/4/2019  3:15 PM   Temp 36.4 °C (97.6 °F) 10/4/2019  3:08 PM   Pulse 77 10/4/2019  3:22 PM   Resp 17 10/4/2019  3:22 PM   SpO2 96 % 10/4/2019  3:22 PM   Vitals shown include unvalidated device data.

## 2019-10-04 NOTE — PERIOP NOTES
TRANSFER - OUT REPORT:    Verbal report given to Marly LEIGH(name) [facility]  being transferred to [facility](unit) for routine post - op       Report consisted of patients Situation, Background, Assessment and   Recommendations(SBAR). Time Pre op antibiotic given:1414  Anesthesia Stop time: 7931  Schuler Present on Transfer to floor:no  Order for Schuler on Chart:na  Discharge Prescriptions with Chart:na    Information from the following report(s) SBAR, Kardex, Intake/Output and MAR was reviewed with the receiving nurse. Opportunity for questions and clarification was provided. Is the patient on 02? NO       L/Min na       Other na    Is the patient on a monitor? NO    Is the nurse transporting with the patient? YES    Surgical Waiting Area notified of patient's transfer from PACU?  YES      The following personal items collected during your admission accompanied patient upon transfer:   Dental Appliance: Dental Appliances: (permanent plate)  Vision:    Hearing Aid:    Jewelry:    Clothing: Clothing: (clothes in locker)  Other Valuables:    Valuables sent to safe:

## 2019-10-04 NOTE — PROGRESS NOTES
TRANSFER - IN REPORT:    Verbal report received from Rep RN(name) on Rosa Peck  being received from [facility](unit) for routine post - op      Report consisted of patients Situation, Background, Assessment and   Recommendations(SBAR). Information from the following report(s) SBAR, Kardex, OR Summary, Procedure Summary, Intake/Output and MAR was reviewed with the receiving nurse. Opportunity for questions and clarification was provided. Assessment completed upon patients arrival to unit and care assumed. 1620- patient and patients  arrived on the unit, both appeared angry and were in shock that the patient was to stay the night in the hospital after surgery. Patient is stable and no complaints of pain or nausea. Is voiding. 36- MD called to clarify admission orders. 65- MD gave orders to DC patient after they come and round on them. 1720- explained to the patient that there was a misunderstanding since the patient stated she would need to stay the night because she wouldn't have a ride home after surgery. The patient stated she remembered having that conversation with the MD and said everything was fine. 1840- MD at bedside, orders to DC    1900- Patients  still appears angry and is cussing at the nursing staff, making rude comments. 1910-I have reviewed discharge instructions with the patient and spouse. The patient and spouse verbalized understanding. Discharge medications reviewed with patient and spouse and appropriate educational materials and side effects teaching were provided. 1920- Patients  still appears angry, is very loudly and rudely making comments towards the PCT.

## 2019-10-04 NOTE — PERIOP NOTES
Dr. Zeny Ryan talked with and examined patient. Pt is a smoker, no inhalers. Dr. Zeny Ryan auscultated and states pt clear preoperatively.

## 2019-10-04 NOTE — OP NOTES
Date of Procedure: 04 October June 2019  Pre-operative Diagnosis: Primary Hyperparathyroidism  Post-operative Diagnosis: Primary Hyperparathyroidism  Procedure(s):   Neck exploration   Radioguided parathyroid surgery  Parathyroidectomy  resection of LEFT SUPERIOR parathyroid adenoma  Laryngeal nerve monitoring  Surgeon(s) and Role:   Panel 1:   * Shantal Kramer MD  Co-surgeon   * Mary Seals MD - Co-surgeon   Anesthesia: General +4 ml of Local (50:50 mix of 1% LIDO + EPI)   Urine output: Not documented   Estimated Blood Loss: 1 ml    IVF: 700 ml   Drains: None   Pre-operative iPTH = 136.1 pg/ml   Patient in room at 1357 hours   Antibiotic prophylaxis ANCEF 2.0 gm given at 1414 hours   Beta blocker not indicated   Pre-prep time out: 1411 hours   Prayer at 1423 hours   Time out for Surgery at 1423 hours   (Correct patient, operative site and procedure confirmed, along with having the necessary equipment on hand to perform the operation safely)   Start of surgery at 1423 hours   End of surgery at 1452 hours   VTE prophylaxis with bilateral thigh high TAMIA hose and bilateral lower extremity compression devices   Pressure points padded   Sponge, sharp and instrument count: Correct   History:  53-year-old female with symptomatic primary hyperparathyroidism. History of multiple kidney stones. She has biochemically confirmed primary hyperparathyroidism:   Serum calcium 11.1 mg/dl  iPTH 138.1 pg/ml    Pre-operative sestamibi scan shows left-sided neck uptake consistent with left sided parathyroid adenoma    Pre-op holding area: The patient was seen in the pre-operative holding area.    Patient was informed of the indications, nature, risks, benefits, alternatives and expected outcomes of the proposed operation:   Neck exploration   Radioguided parathyroid surgery / parathyroidectomy   All other indicated procedures   The patient previously voiced understanding of the aforesaid and provided informed consent for the planned operation   The patient asked pertinent questions, all of which were answered to her apparent satisfaction   The informed consent was reviewed in the holding area and the patient voiced understanding and agreement. The patient agreed to proceed with the recommended operative procedure (Neck exploration; radioguided parathyroid surgery / parathyroidectomy; all other indicated procedures). The patient was examined, and the operative site was marked. Pre-operative Sestamibi Scan done prior to operation is localizing to the left side of the neck in the superior parathyroid gland location  Procedure (s) performed:   Neck exploration   Radioguided parathyroid surgery  Parathyroidectomy  resection of LEFT SUPERIOR parathyroid adenoma  Laryngeal nerve monitoring   Findings:   One abnormal parathyroid gland identified  grossly consistent with adenoma in the LEFT SUPERIOR anatomical location   Clinically normal right superior parathyroid gland  Clinically normal right inferior parathyroid gland  Clinically normal left inferior parathyroid gland  Left superior parathyroid adenoma (44 x 18 mm; 2.60 grams); completely excised  intact and submitted to pathology. Frozen section: confirmatory  hypercellular parathyroid tissue   Left inferior parathyroid gland, clinically normal (6 x 4 mm); No biopsy obtained. Right superior parathyroid gland, clinically normal (5 x 4 mm); No biopsy obtained. Right inferior parathyroid gland, clinically normal (6 x 4 mm); No biopsy obtained. No palpable thyroid nodule  No findings suspicious for malignancy. No palpable central or lateral neck adenopathy.    A diligent search of the central and both lateral areas of the neck was unrevealing, specifically there was no palpable adenopathy in either the right or left lateral (Level II, III, IV) neck, or the central compartment (Level VI and VII)     Both right and left recurrent laryngeal nerves were identified and carefully preserved throughout the entire course of the operation, and both recurrent laryngeal nerves were confirmed to be structurally and functionally intact. The structural and functional integrity of the left and right recurrent laryngeal nerves was confirmed with the nerve stimulator (NIM System), as prominent audible signal was attained when both branches of the left and right recurrent laryngeal nerves were stimulated. Excellent hemostasis confirmed at end of operation       Specimens:  Para-   thyroid  Right    Left      Type  Score   Type  Score    Upper     Upper      Parathyroid Clinically normal   (5 x 4 mm in size)  Biopsy not obtained  Frozen section: None    Parathyroid Adenoma   (44 x 18 mm in size;   weight 2.60 grams)   Completely excised   Frozen: Hypercellular parathyroid   Counts = 394  Background: 34     Lower          Parathyroid Clinically normal   (6 x 4 mm in size)  Biopsy not obtained  Frozen section: None    Parathyroid Clinically normal   (6 x 4 mm in size)  Biopsy not obtained  Frozen section: None        Operation:   The patient was escorted to the operating room   Upon arrival to the operative room, the patient, procedure, and operative site were confirmed via a pre-operative time-out; all staff in attendance were in agreement. The patient was placed in the supine position and general anesthesia induced without incident, using a NIM endotracheal tube for laryngeal nerve monitoring. Prophylactic antibiotic (ANCEF 2 g IVPB) was administered prior to the procedure. Beta blocker not indicated  With the patient in the supine position the arms were tucked, a pillow was placed behind the knees and the heels padded; the neck was slightly extended, and head of the operating table elevated to 30 degrees.    Roll was placed behind the scapulae to create mild degree of neck flexion   Pressure ulcer prophylaxis was in effect with pressure point padding   VTE prophylaxis was also in effect with TAMIA hose and lower extremity mechanical compression devices   Sterile anterior neck prep (Chlorhexidine - alcohol) and drape   We prayed for our patient and we repeated the TIME OUT prior to commencing the operation to confirm the    correct patient,    correct operative site,    correct operative procedure, and    necessary equipment on hand to conduct the operation safely. Local anesthesia (4 ml of 50:50 mix of 1% LIDO + EPI) infiltrated subcutaneously at site of planned anterior cervical skin incision   Pre-operative serum iPTH level was 136.1 pg/ml. A 5 cm, anterior cervical incision made. Limited sub-platysmal flaps were created. [employer] Navigator GPS radio-immuno-guided surgery device was set on Tc99m and 100X. Strap musculature was  in the midline. A dissection plane was developed posterior to the right strap musculature using cautery and facilitated by atraumatic [facility] retractors. Lendon Jayme was used to displace the right thyroid lobe anteriorly and medially as the right lateral thyroid recess was developed using electrocautery, and then gentle blunt dissection using the Kitner dissector. The fibroareolar floor in the right posterior neck was opened with electrocautery that provided access to the right retroesophageal space. We sought to identify the key structures for exposure of the right superior parathyroid gland the arteriovenous band situated immediately cephalad to where the right middle thyroid vein is situated, and posterior and lateral to the recurrent laryngeal nerve. The superior parathyroid gland is usually encountered at the Ligament of Perez dorsolateral to the recurrent laryngeal nerve. The superior parathyroid gland originates from the 4th pharyngeal pouch. The superior parathyroid gland is more posterior in position relative to the more anteriorly situated inferior parathyroid gland.    The inferior parathyroid gland is usually encountered anterior and medial to the recurrent laryngeal nerve, inferior to where the recurrent laryngeal nerve crosses the inferior thyroid artery. The inferior parathyroid gland originates from the 3rd pharyngeal pouch. Typically (~75% of cases), the superior parathyroid gland is situated near the posterior aspect of the thyroid gland, at the level of the cricothyroid junction, in proximity to where the recurrent laryngeal nerve enters the larynx. The superior parathyroid gland is consistently situated dorsal/posterior and lateral to the recurrent laryngeal nerve. In a smaller percentage of patients (~24% of cases) the superior parathyroid gland is adjacent to the superior pole of the thyroid gland. In a very small percentage of patients (~1%), the superior parathyroid gland is in a retro-pharyngeal, para-esophageal, retro-esophageal, or posterior-superior mediastinal location. Rarely is the superior parathyroid gland situated within the substance of the superior or lateral pole of the thyroid gland. The right superior parathyroid gland was identified and found to be clinically normal in size and appearance (5 x 4 mm in size)  soft, tan, flattened, ovoid and smoothly encapsulated. We took care not to disrupt the right superior parathyroid gland during dissection, mobilization and complete evaluation of this parathyroid gland. The normal appearing right superior parathyroid gland was not biopsied  ---   We began the search for the right inferior parathyroid gland by dissecting gently low into the thyro-thymic ligament and apical thymus, and then proceeding cephalad with the dissection. The anatomic location of the inferior parathyroid gland is much more variable than that of the superior parathyroid gland, given the longer and more variable embryological migratory path of the 3rd pharyngeal pouch with the thymus.   The inferior parathyroid gland may be embedded within the thymus, within the thyroid gland itself, or found within the anterior or posterior mediastinum. The most common location we find the inferior parathyroid gland (~40% of the time) is in proximity to the posterior thyroid capsule at the level of the 1st tracheal ring, in proximity to the inferior thyroid artery - anterior and medial to the recurrent laryngeal nerve, inferior to where the nerve crosses the inferior thyroid artery. When we dont find the inferior parathyroid gland in this location we search for other possible anatomic locations  thyro-thymic ligament, within the substance of the thymus near the level of the thoracic inlet (in ~40% of cases the inferior parathyroid gland is found in this location). Other potential anatomic locations for the inferior parathyroid gland include lateral to the thyroid gland (15%), or in atypical or ectopic locations (carotid sheath or mid-thyroid level lateral to the carotid sheath, or deep within the thyroid gland itself 2% of cases). When not identified during initial operation, post-operative imaging is performed in cases of persistent or recurrent hyperparathyroidism to search for the abnormal inferior parathyroid gland, or in rare cases, the supernumerary or 5th parathyroid gland. Anatomic areas assessed during post-operative imaging in patients with persistent or recurrent hyperparathyroidism include the anterior or posterior mediastinum, posterior to the trachea and anterior to the esophagus, or posterior to the esophagus, and at or above the angle of the mandible (to assess for an undescended parathyroid gland). The right inferior parathyroid gland was identified and found to be clinically normal in size and appearance (6 x 4 mm in size)  soft, tan, flattened, ovoid and smoothly encapsulated. We took care not to disrupt the right superior parathyroid gland during dissection, mobilization and complete evaluation of this parathyroid gland.    The normal appearing right inferior parathyroid gland was not biopsied  ---   Attention was then directed to the left neck. A dissection plane was developed posterior to the left neck strap musculature using cautery and facilitated by atraumatic [employer] retractors   A Milon Hirschfeld was used to displace the left thyroid lobe anteromedially as the left lateral thyroid recess was developed using electrocautery, and then gentle blunt dissection using a Kitner dissector. The fibroareolar floor was opened with electrocautery that provided access to the left posterior neck  the retroesophageal space. We sought to identify the key structures for exposure of the left superior parathyroid gland the arteriovenous band situated immediately cephalad to where the left middle thyroid vein is situated. The left superior parathyroid gland was situated just posterior and lateral to the encountered left recurrent laryngeal nerve   Dissection was performed using a combination of gentle blunt Kitner dissection as well as the tip of a right-angle clamp to identify and expose an abnormal appearing left superior parathyroid gland (44 x 18 mm in size)  grossly consistent with left superior parathyroid gland adenoma. We took care not to disrupt the parathyroid gland during dissection, mobilization and complete evaluation of the left superior parathyroid gland. ---   We began the search for the left inferior parathyroid gland by dissecting gently low into the thyro-thymic ligament and thymus and then proceeding cephalad with the dissection. Dissection was performed using a combination of gentle blunt Kitner dissection as well as the tip of a right-angle clamp to identify and expose a normal appearing left inferior parathyroid gland.   The left inferior parathyroid gland was situated just anterior and medial to the encountered left recurrent laryngeal nerve   The left inferior parathyroid gland was examined in its entirety and found to be clinically normal in size and appearance (6 x 4 mm in size)  soft, tan, flattened, ovoid and smoothly encapsulated. We took care not to disrupt the parathyroid gland capsule during dissection, mobilization and complete evaluation of the left inferior parathyroid gland. The normal appearing left inferior parathyroid gland was not biopsied   ---   Having identified four parathyroid glands, one of which was abnormal in size and appearance (the left superior parathyroid gland), we re-directed our attention to the abnormal parathyroid gland  We took care not to disrupt the parathyroid gland during dissection, mobilization and complete evaluation of the left superior parathyroid gland. The left superior parathyroid gland adenoma was excised (44 x 18 mm in size and 2.60 grams in weight with ex vivo counts of 394 pg/mg/min and background count of 34 pg/mg/min; i.e. hyperactive parathyroid), and submitted to frozen section, which was confirmatory  hypercellular parathyroid tissue   Blood was drawn after the excision of the left superior parathyroid adenoma - serum iPTH 10 minutes following left superior parathyroid adenoma resection was 17.7 pg/ml. ---  Throughout our dissection on both sides of the neck we oriented on the recurrent laryngeal nerves and kept the dissection on the capsule of all four parathyroid glands, taking great care not to rupture the parathyroid gland capsule during complete assessment of each parathyroid gland. We made certain to expose the entire parathyroid gland for each one identified to assess each gland fully. As we did on the right, during dissection on the left we took care to maintain the encountered recurrent laryngeal nerve out of harms way.    With three normal appearing parathyroid glands identified (right and left inferior and right superior parathyroid glands), and one abnormal parathyroid gland excised (left superior parathyroid adenoma  hypercellular parathyroid tissue on frozen section), and with normalization of serum iPTH after removal of the left superior parathyroid adenoma, we elected to conclude the operation. To summarize: 10 minutes post-resection of the left superior parathyroid adenoma, serum iPTH level dropped from 136.1 pg/ml to 17.7 pg/ml, biochemically consistent with curative resection according to our pre-specified criteria:   1. > 50% drop in serum iPTH from pre-operative baseline, and   2. drop of serum iPTH into the normal range. The structural and functional integrity of the left and right recurrent laryngeal nerves was confirmed with the nerve stimulator (FLIP4NEW System), as a loud audible signal was attained when both branches of the left and right recurrent laryngeal nerves were stimulated. A diligent search of the central and both lateral areas of the neck was unrevealing, specifically there was no palpable adenopathy in either the right or left lateral (Level II, III, IV) neck, or the central compartment (Level VI and VII). Excellent hemostasis was confirmed. The operative site was irrigated with normal saline and excellent hemostasis confirmed. Excellent hemostasis in the operative field was re-confirmed under repeated Valsalva maneuver. The operative field was irrigated with saline and hemostasis once again confirmed  Yrn was placed in the operative field as a hemostatic adjunct   The strap musculature (sternothyroid and sternohyoid muscles) was re-approximated in the midline with running 3-0 Vicryl suture. The platysma muscle was reconstituted with running absorbable (3-0 Vicryl) suture. The wound was irrigated with saline. The skin closure was completed with running subcuticular 5-0 monocryl suture and transversely oriented Steri-strips applied to the closed surgical incision   This was an uncomplicated operation with minimal blood loss.    The patient was extubated without incident and escorted to the PACU in stable condition with aspiration precautions in effect Complications: None   Implants: None   Disposition:    To PACU extubated and in stable condition with aspiration precautions in effect   Mandie EVANSA FACS  Mago Allred MD

## 2019-10-04 NOTE — DISCHARGE INSTRUCTIONS
Post  Parathyroidectomy Instructions    Follow up: with Dr. Nick Reid 4 weeks after surgery  Shortly after surgery call [phone] to schedule this appointment. Remove your steristrips after 2 weeks    If you have no tingling or numbness in your fingers or around your mouth after 10 days - 10 days after surgery - you may start tapering your calcium as follows: Take 1 tablet every 6 hours for 3 days then  1 tablet every 8 hours for 3 days then  1 tablet every 12 hours for 3 days then  1 tablet daily for 3 days then stop     Eat regular foods.  You may shower in 24 hours. Do not allow direct water pressure on your wound. If water trickles down while washing your hair, allow the wound to dry on its own.  Do not scrub or soak your wound for 2 weeks or 14 days.  No strenuous activity: for 14 days.  No moving more than 15 pounds: for 14 days. Then includes pulling, pushing, tugging, throwing.  There is generally not a lot of pain: with this surgery. Take your pain medication as needed. Most patients, if they do have pain, will have neck stiffness/discomfort. You may have numbness or tingling surrounding the area of your wound. Narcotics can cause constipation; use your Colace if this is the case.  Nausea and vomiting: from lingering effects of general anesthesia usually resolves by the following day. The narcotic pain medication can cause nausea and vomiting. They should be taken with food or fluids to minimize this. Medications that reduce nausea and vomiting can be prescribed by your physician.  Fever above 100.4, redness around wound, pus drainage from wound: call your doctor.  Bleeding: is uncommon (less than 1%). If it does occur your neck will develop a fullness. It is good to take a look at your neck shortly after surgery to see what a baseline appearance is. If there are changes to this, then call your provider. \    CALL or TEXT Dr. Nick Reid for questions or concerns - text works best - [address] may be on Calcium ([address]) - it is very important that you take this. Dr. Eloy Cuevas will start a taper at your follow up visit  o If you experience tingling in the hands or around your mouth, your calcium may be dropping, go to the emergency room immediately and tell them you had your parathyroid removed.

## 2019-10-07 LAB — HGB BLD-MCNC: 15.6 G/DL (ref 11.5–16)

## 2022-03-20 PROBLEM — E21.3 HYPERPARATHYROIDISM (HCC): Status: ACTIVE | Noted: 2019-10-04

## 2024-12-05 ENCOUNTER — ANCILLARY PROCEDURE (OUTPATIENT)
Facility: HOSPITAL | Age: 59
End: 2024-12-05
Attending: STUDENT IN AN ORGANIZED HEALTH CARE EDUCATION/TRAINING PROGRAM
Payer: COMMERCIAL

## 2024-12-05 ENCOUNTER — HOSPITAL ENCOUNTER (EMERGENCY)
Facility: HOSPITAL | Age: 59
Discharge: ANOTHER HOSPITAL | End: 2024-12-05
Attending: STUDENT IN AN ORGANIZED HEALTH CARE EDUCATION/TRAINING PROGRAM
Payer: COMMERCIAL

## 2024-12-05 ENCOUNTER — APPOINTMENT (OUTPATIENT)
Facility: HOSPITAL | Age: 59
End: 2024-12-05
Payer: COMMERCIAL

## 2024-12-05 VITALS
DIASTOLIC BLOOD PRESSURE: 87 MMHG | HEART RATE: 86 BPM | HEIGHT: 67 IN | TEMPERATURE: 98.3 F | OXYGEN SATURATION: 97 % | RESPIRATION RATE: 16 BRPM | WEIGHT: 200 LBS | BODY MASS INDEX: 31.39 KG/M2 | SYSTOLIC BLOOD PRESSURE: 168 MMHG

## 2024-12-05 DIAGNOSIS — H33.21 RIGHT RETINAL DETACHMENT: Primary | ICD-10-CM

## 2024-12-05 LAB
ALBUMIN SERPL-MCNC: 4.1 G/DL (ref 3.5–5.2)
ALBUMIN/GLOB SERPL: 1.3 (ref 1.1–2.2)
ALP SERPL-CCNC: 89 U/L (ref 35–104)
ALT SERPL-CCNC: 15 U/L (ref 10–35)
ANION GAP SERPL CALC-SCNC: 13 MMOL/L (ref 2–12)
AST SERPL-CCNC: 17 U/L (ref 10–35)
BASOPHILS # BLD: 0.1 K/UL (ref 0–1)
BASOPHILS NFR BLD: 1 % (ref 0–1)
BILIRUB SERPL-MCNC: 0.6 MG/DL (ref 0.2–1)
BUN SERPL-MCNC: 12 MG/DL (ref 8–23)
BUN/CREAT SERPL: 23 (ref 12–20)
CALCIUM SERPL-MCNC: 9.2 MG/DL (ref 8.8–10.2)
CHLORIDE SERPL-SCNC: 99 MMOL/L (ref 98–107)
CO2 SERPL-SCNC: 25 MMOL/L (ref 22–29)
CREAT SERPL-MCNC: 0.52 MG/DL (ref 0.5–0.9)
DIFFERENTIAL METHOD BLD: ABNORMAL
EOSINOPHIL # BLD: 0.2 K/UL (ref 0–0.4)
EOSINOPHIL NFR BLD: 3 %
ERYTHROCYTE [DISTWIDTH] IN BLOOD BY AUTOMATED COUNT: 13.2 % (ref 11.5–14.5)
GLOBULIN SER CALC-MCNC: 3.2 G/DL (ref 2–4)
GLUCOSE SERPL-MCNC: 91 MG/DL (ref 65–100)
HCT VFR BLD AUTO: 43.1 % (ref 35–47)
HGB BLD-MCNC: 15 G/DL (ref 11.5–16)
IMM GRANULOCYTES # BLD AUTO: 0 K/UL (ref 0–0.04)
IMM GRANULOCYTES NFR BLD AUTO: 0 % (ref 0–0.5)
LYMPHOCYTES # BLD: 2 K/UL (ref 0.8–3.5)
LYMPHOCYTES NFR BLD: 29 % (ref 12–49)
MCH RBC QN AUTO: 30.5 PG (ref 26–34)
MCHC RBC AUTO-ENTMCNC: 34.8 G/DL (ref 30–36.5)
MCV RBC AUTO: 87.8 FL (ref 80–99)
MONOCYTES # BLD: 0.6 K/UL (ref 0–1)
MONOCYTES NFR BLD: 9 % (ref 5–13)
NEUTS SEG # BLD: 4 K/UL (ref 1.8–8)
NEUTS SEG NFR BLD: 58 % (ref 32–75)
NRBC # BLD: 0 K/UL (ref 0–0.01)
NRBC BLD-RTO: 0 PER 100 WBC
PLATELET # BLD AUTO: 270 K/UL (ref 150–400)
PMV BLD AUTO: 10.6 FL (ref 8.9–12.9)
POTASSIUM SERPL-SCNC: 3.8 MMOL/L (ref 3.5–5.1)
PROT SERPL-MCNC: 7.3 G/DL (ref 6.4–8.3)
RBC # BLD AUTO: 4.91 M/UL (ref 3.8–5.2)
SODIUM SERPL-SCNC: 137 MMOL/L (ref 136–145)
TROPONIN T SERPL HS-MCNC: <6 NG/L (ref 0–14)
WBC # BLD AUTO: 6.8 K/UL (ref 3.6–11)

## 2024-12-05 PROCEDURE — 99285 EMERGENCY DEPT VISIT HI MDM: CPT

## 2024-12-05 PROCEDURE — 85025 COMPLETE CBC W/AUTO DIFF WBC: CPT

## 2024-12-05 PROCEDURE — 70450 CT HEAD/BRAIN W/O DYE: CPT

## 2024-12-05 PROCEDURE — 70498 CT ANGIOGRAPHY NECK: CPT

## 2024-12-05 PROCEDURE — 80053 COMPREHEN METABOLIC PANEL: CPT

## 2024-12-05 PROCEDURE — 84484 ASSAY OF TROPONIN QUANT: CPT

## 2024-12-05 PROCEDURE — 6370000000 HC RX 637 (ALT 250 FOR IP): Performed by: STUDENT IN AN ORGANIZED HEALTH CARE EDUCATION/TRAINING PROGRAM

## 2024-12-05 PROCEDURE — 36415 COLL VENOUS BLD VENIPUNCTURE: CPT

## 2024-12-05 PROCEDURE — 93005 ELECTROCARDIOGRAM TRACING: CPT | Performed by: STUDENT IN AN ORGANIZED HEALTH CARE EDUCATION/TRAINING PROGRAM

## 2024-12-05 PROCEDURE — 6360000004 HC RX CONTRAST MEDICATION: Performed by: STUDENT IN AN ORGANIZED HEALTH CARE EDUCATION/TRAINING PROGRAM

## 2024-12-05 RX ORDER — TETRACAINE HYDROCHLORIDE 5 MG/ML
1 SOLUTION OPHTHALMIC
Status: COMPLETED | OUTPATIENT
Start: 2024-12-05 | End: 2024-12-05

## 2024-12-05 RX ORDER — IOPAMIDOL 755 MG/ML
100 INJECTION, SOLUTION INTRAVASCULAR
Status: COMPLETED | OUTPATIENT
Start: 2024-12-05 | End: 2024-12-05

## 2024-12-05 RX ADMIN — TETRACAINE HYDROCHLORIDE 1 DROP: 5 SOLUTION OPHTHALMIC at 14:41

## 2024-12-05 RX ADMIN — IOPAMIDOL 100 ML: 755 INJECTION, SOLUTION INTRAVENOUS at 14:32

## 2024-12-05 RX ADMIN — FLUORESCEIN SODIUM 1 MG: 1 STRIP OPHTHALMIC at 14:41

## 2024-12-05 ASSESSMENT — PAIN - FUNCTIONAL ASSESSMENT: PAIN_FUNCTIONAL_ASSESSMENT: 0-10

## 2024-12-05 ASSESSMENT — PAIN SCALES - GENERAL
PAINLEVEL_OUTOF10: 0
PAINLEVEL_OUTOF10: 0

## 2024-12-05 ASSESSMENT — VISUAL ACUITY
OS: 20/40
OU: 20/40

## 2024-12-05 ASSESSMENT — LIFESTYLE VARIABLES
HOW MANY STANDARD DRINKS CONTAINING ALCOHOL DO YOU HAVE ON A TYPICAL DAY: PATIENT DOES NOT DRINK
HOW OFTEN DO YOU HAVE A DRINK CONTAINING ALCOHOL: NEVER

## 2024-12-05 NOTE — ED NOTES
TRANSFER - OUT REPORT:    Verbal report given to ROLY Gutiérrez  on Sun Lopez  being transferred to [facility] for urgent transfer       Report consisted of patient's Situation, Background, Assessment and   Recommendations(SBAR).     Information from the following report(s) Nurse Handoff Report, ED Encounter Summary, ED SBAR, Adult Overview, Recent Results, and Event Log was reviewed with the receiving nurse.    Jasper Fall Assessment:    Presents to emergency department  because of falls (Syncope, seizure, or loss of consciousness): No  Age > 70: No  Altered Mental Status, Intoxication with alcohol or substance confusion (Disorientation, impaired judgment, poor safety awaremess, or inability to follow instructions): No  Impaired Mobility: Ambulates or transfers with assistive devices or assistance; Unable to ambulate or transer.: No             Lines:   Peripheral IV 12/05/24 Left Antecubital (Active)        Opportunity for questions and clarification was provided.      Patient transported with:

## 2024-12-05 NOTE — ED PROVIDER NOTES
[facility] EMERGENCY DEPT  EMERGENCY DEPARTMENT ENCOUNTER      Pt Name: Sun Lopez  MRN: [id number]  Birthdate 1965  Date of evaluation: 12/5/2024  Provider: Ghazala Diop DO    CHIEF COMPLAINT       Chief Complaint   Patient presents with    Eye Problem       PMH   Past Medical History:   Diagnosis Date    Hypertension     borderline, no meds    Ill-defined condition     kidney stones    Nausea & vomiting          MDM:   Vitals:    Vitals:    12/05/24 1323   BP: (!) 172/92   Pulse: 87   Resp: 18   Temp: 98.2 °F (36.8 °C)   SpO2: 97%           This is a 59 y.o. female with pmhx HTN, kidney stones who presents today for cc of vision loss.  Patient states that approximately 10 or 11 days ago she was visiting some ill family out of state, she noted that 1 morning she woke up and a portion of her peripheral vision seemed to be dark on her right eye only.  She states that as a child she had some issue with potential retinal detachment with the left eye, no official attachment happened and she had some kind of surgery to fix it.  She states that since then the area of darkness is changed shape a little bit, it looked heart-shaped at 1 point and now it looks like a semicircle chunk out of the bottom of her peripheral vision.  It moves when she moves her head.  She denies any eye pain.  Nursing triage note mentions potential headache but she denies any to me.     On arrival VS stable.   Physical Exam  General: Alert, no acute distress  HEENT: Normocephalic, atraumatic. EOMI, moist oral mucosa, no conjunctival injection.  Intraocular pressure on the left is 14 mmHg, on the right 11 mmHg.  Fluorescein stain exam negative.  Small portion of bottom peripheral vision of the right eye was diminished.  Neck: ROM normal, supple  Cardio: Heart regular rate and rhythm  Lungs: No respiratory distress  MSK: ROM normal, no LE edema  Skin: Warm, dry, no rash  Neuro: No focal neurodeficits, Aox3, NIH 0    Patient had initially

## 2024-12-05 NOTE — ED TRIAGE NOTES
Patient ambulatory to triage with c/o vision loss in right eye. States sx began a few days before Thanksgiving.     Reports initially she loss peripheral vision in right eye, but has since regained some of her peripheral vision, but has lost vision in the lower-middle aspect of right eye.     States vision is \"dark\" in that area.    Reports intermittent headache. Denies numbness/tingling, extremity weakness, change in speech or gait.

## 2024-12-06 LAB
EKG ATRIAL RATE: 91 BPM
EKG DIAGNOSIS: NORMAL
EKG P AXIS: 11 DEGREES
EKG P-R INTERVAL: 136 MS
EKG Q-T INTERVAL: 378 MS
EKG QRS DURATION: 80 MS
EKG QTC CALCULATION (BAZETT): 464 MS
EKG R AXIS: 39 DEGREES
EKG T AXIS: 27 DEGREES
EKG VENTRICULAR RATE: 91 BPM

## 2024-12-06 PROCEDURE — 93010 ELECTROCARDIOGRAM REPORT: CPT | Performed by: STUDENT IN AN ORGANIZED HEALTH CARE EDUCATION/TRAINING PROGRAM

## (undated) DEVICE — ROCKER SWITCH PENCIL BLADE ELECTRODE, HOLSTER: Brand: EDGE

## (undated) DEVICE — SUTURE MCRYL SZ 4-0 L27IN ABSRB UD L19MM PS-2 1/2 CIR PRIM Y426H

## (undated) DEVICE — HANDLE LT SNAP ON ULT DURABLE LENS FOR TRUMPF ALC DISPOSABLE

## (undated) DEVICE — GARMENT,MEDLINE,DVT,INT,CALF,MED, GEN2: Brand: MEDLINE

## (undated) DEVICE — INTENDED FOR TISSUE SEPARATION, AND OTHER PROCEDURES THAT REQUIRE A SHARP SURGICAL BLADE TO PUNCTURE OR CUT.: Brand: BARD-PARKER ® CARBON RIB-BACK BLADES

## (undated) DEVICE — REM POLYHESIVE ADULT PATIENT RETURN ELECTRODE: Brand: VALLEYLAB

## (undated) DEVICE — AGENT HEMSTAT W2XL3IN OXIDIZED REGENERATED CELOS ABSRB

## (undated) DEVICE — SUT SLK 4-0 18IN TIE MP BLK --

## (undated) DEVICE — INSULATED BLADE ELECTRODE: Brand: EDGE

## (undated) DEVICE — KIT APPL SPRY HEMSTAT PWDR -- F/HEMADERM FLEXTIP

## (undated) DEVICE — KIT,1200CC CANISTER,3/16"X6' TUBING: Brand: MEDLINE INDUSTRIES, INC.

## (undated) DEVICE — TOWEL SURG W17XL27IN STD BLU COT NONFENESTRATED PREWASHED

## (undated) DEVICE — SUTURE PERMAHAND SZ 3-0 L30IN NONABSORBABLE BLK SILK BRAID A304H

## (undated) DEVICE — 7 FRENCH DRAIN SYSTEM, STERILE: Brand: TLS

## (undated) DEVICE — MASTISOL ADHESIVE LIQ 2/3ML

## (undated) DEVICE — (D)PREP SKN CHLRAPRP APPL 26ML -- CONVERT TO ITEM 371833

## (undated) DEVICE — PACK,EENT,TURBAN DRAPE,PK II: Brand: MEDLINE

## (undated) DEVICE — EMG TUBE 8229707 NIM TRIVANTAGE 7.0MM ID: Brand: NIM TRIVANTAGE™

## (undated) DEVICE — SURGICAL PROCEDURE PACK BASIN MAJ SET CUST NO CAUT

## (undated) DEVICE — SOLUTION IV 1000ML 0.9% SOD CHL

## (undated) DEVICE — MAGNETIC INSTR DRAPE 20X16: Brand: MEDLINE INDUSTRIES, INC.

## (undated) DEVICE — SPONGE GZ W4XL4IN COT RADPQ HIGHLY ABSRB

## (undated) DEVICE — HOOK RETRCT L5MM E SHRP SELF RET SYS LONE STAR

## (undated) DEVICE — INFECTION CONTROL KIT SYS

## (undated) DEVICE — PROBE 8225101 5PK STD PRASS FL TIP ROHS

## (undated) DEVICE — STRIP,CLOSURE,WOUND,MEDI-STRIP,1/2X4: Brand: MEDLINE

## (undated) DEVICE — SPONGE: SPECIALTY PEANUT XR 100/CS: Brand: MEDICAL ACTION INDUSTRIES

## (undated) DEVICE — SUTURE ETHLN SZ 4-0 L18IN NONABSORBABLE BLK L19MM PS-2 3/8 1667H

## (undated) DEVICE — COVER US PRB W12XL244CM FLD IORT STR TIP

## (undated) DEVICE — SYR 10ML LUER LOK 1/5ML GRAD --

## (undated) DEVICE — BIPOLAR FORCEPS CORD: Brand: VALLEYLAB

## (undated) DEVICE — SUTURE VCRL SZ 3-0 L27IN ABSRB UD L26MM SH 1/2 CIR J416H

## (undated) DEVICE — Device

## (undated) DEVICE — Z DISCONTINUED GLOVE SURG SZ 7 L12IN FNGR THK13MIL WHT ISOLEX POLYISOPRENE

## (undated) DEVICE — SHEAR RMFG HARMONIC FOCUS 9CM -- OEM ITEM L#322125